# Patient Record
Sex: MALE | Race: WHITE
[De-identification: names, ages, dates, MRNs, and addresses within clinical notes are randomized per-mention and may not be internally consistent; named-entity substitution may affect disease eponyms.]

---

## 2018-09-09 ENCOUNTER — HOSPITAL ENCOUNTER (INPATIENT)
Dept: HOSPITAL 80 - FED | Age: 48
LOS: 2 days | Discharge: HOME | DRG: 64 | End: 2018-09-11
Attending: INTERNAL MEDICINE | Admitting: INTERNAL MEDICINE
Payer: COMMERCIAL
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
## 2018-09-09 DIAGNOSIS — Z87.891: ICD-10-CM

## 2018-09-09 DIAGNOSIS — I77.810: ICD-10-CM

## 2018-09-09 DIAGNOSIS — E86.9: ICD-10-CM

## 2018-09-09 DIAGNOSIS — I10: ICD-10-CM

## 2018-09-09 DIAGNOSIS — I77.74: ICD-10-CM

## 2018-09-09 DIAGNOSIS — R27.0: ICD-10-CM

## 2018-09-09 DIAGNOSIS — Q21.1: ICD-10-CM

## 2018-09-09 DIAGNOSIS — I63.9: Primary | ICD-10-CM
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
## 2018-09-09 LAB
INR PPP: 0.98 (ref 0.83–1.16)
PLATELET # BLD: 248 10^3/UL (ref 150–400)
PROTHROMBIN TIME: 13.2 SEC (ref 12–15)

## 2018-09-09 RX ADMIN — ACETAMINOPHEN PRN MG: 325 TABLET ORAL at 23:23

## 2018-09-09 NOTE — CPEKG
Test Reason : OPEN

Blood Pressure : ***/*** mmHG

Vent. Rate : 057 BPM     Atrial Rate : 058 BPM

   P-R Int : 224 ms          QRS Dur : 105 ms

    QT Int : 459 ms       P-R-T Axes : 045 000 032 degrees

   QTc Int : 447 ms

 

Sinus rhythm

Prolonged AL interval

Left ventricular hypertrophy

 

Confirmed by Joshua Guidry ([id number]) on 9/9/2018 3:14:53 PM

 

Referred By:             Confirmed By:Joshua Guidry

## 2018-09-09 NOTE — EDPHY
H & P


Time Seen by Provider: 09/09/18 13:46


HPI/ROS: 





Chief complaint.  Clumsiness with right arm and right leg





HPI.  Patient is in 48-year-old male developed clumsiness to right arm and 

right leg yesterday morning wish they say was about 30 hr ago at approximately 8

:00 a.m. On Saturday morning.  He has dizziness and vomiting.  They thought he 

had a middle ear infection and use some over-the-counter medications.  He is 

vomiting is better.  His dizziness is somewhat better however clumsiness and 

weakness to right arm and like with right leg has persisted.  He fell yesterday 

secondary to weakness and clumsiness.  He had a headache yesterday but not 

today.  His vision is normal.  No chest discomfort or shortness of breath or 

abdominal pain.  No similar symptoms previously





ROS


Constitutional.  no fever/chills, no weakness


Eyes.  no problems with vision


ENT.  no sore throat, no nasal drainage


Cardiovascular.  no chest pain


Respiratory.  no shortness of breath, no cough


Abdominal.  no abdominal pain, no nausea/vomiting, no diarrhea


.  no problems urinating


MS.  no calf pain/swelling, no neck/back pain, no joint pain


Skin.  no rash


Lymph.  no swollen glands


Neuro.  Headache yesterday.  Clumsiness and weakness to right arm and right leg


Past Medical/Surgical History: 





Occasional tachycardia


Social History: 





, nonsmoker, no alcohol


Smoking Status: Never smoked


Physical Exam: 





General Appearance:  Alert well-developed male mild distress vital signs 

significant for blood pressure 172/112


Eyes: Pupils equal and round no pallor or injection.


ENT, Mouth:  Mucous membranes are moist.


Respiratory:  There are no retractions, lungs are clear to auscultation.


Cardiovascular: Regular rate and rhythm.


Gastrointestinal:   Abdomen is soft and nontender, no masses, bowel sounds 

normal.


Neurological:  Awake and alert.  Speech is normal.  Cranial nerves are normal.  

There is no pronator drift.  Finger to nose is abnormal with the right arm.  

Heel-to-shin is somewhat abnormal with the right leg.  Left side is normal


Skin: Warm and dry, no rashes.


Musculoskeletal:  Neck is supple nontender.


Extremities  symmetrical, full range of motion.


Psychiatric: Patient is oriented X 3, there is no agitation.


Constitutional: 


 Initial Vital Signs











Temperature (C)  36.7 C   09/09/18 13:38


 


Heart Rate  64   09/09/18 13:38


 


Respiratory Rate  18   09/09/18 13:38


 


Blood Pressure  172/112 H  09/09/18 13:38


 


O2 Sat (%)  97   09/09/18 13:38








 











O2 Delivery Mode               Room Air














Allergies/Adverse Reactions: 


 





No Known Allergies Allergy (Unverified 09/09/18 13:38)


 











Medical Decision Making





- Diagnostics


EKG Interpretation: 





EKG interpreted by me shows normal sinus rhythm normal interval.  There is left 

axis deviation.  QRS shows electrical evidence for LVH.  No significant ST 

elevation or depression.  No arrhythmia.  The rate is 57


Imaging Results: 


 Imaging Impressions





Chest X-Ray  09/09/18 13:57


Impression:


1. No acute pulmonary disease.


2. Consider chest two views when the patient's medical condition permits.








Noncontrast head CT shows a subacute infarct in the right cerebellum in the 

posterior pica territory.  There is also dense right vertebral artery


Procedures: 





IV normal saline, monitor


ED Course/Re-evaluation: 





I consulted discussed the case with  at [facility].  He 

recommends admission for stroke workup but no intervention at this time





2:30 p.m. I re-evaluated the patient.  Stable and unchanged.  He and I and his 

wife discussed imaging and lab results.  We discussed treatment plan including 

recommendation for admission.  They expressed understanding





I consulted discussed case with Dr. Ratliff, neurology who recommends CTA head and 

neck and giving the patient aspirin





I consulted and discussed case with Dr. Harris, hospitalist who agrees to 

the admission


Differential Diagnosis: 





Patient head posterior fossa symptoms yesterday and thought was inner ear 

infection.  However he continued to be ataxic.  He has evidence of right 

cerebellar stroke.  He did have trauma while playing hockey on Friday the day 

before and it is possible the patient may have had a vertebral artery 

dissection as a cause.





- Data Points


Laboratory Results: 


 Laboratory Results





 09/09/18 13:50 





 09/09/18 13:50 





 











  09/09/18 09/09/18 09/09/18





  14:16 13:50 13:50


 


WBC      





    


 


RBC      





    


 


Hgb      





    


 


Hct      





    


 


MCV      





    


 


MCH      





    


 


MCHC      





    


 


RDW      





    


 


Plt Count      





    


 


MPV      





    


 


Neut % (Auto)      





    


 


Lymph % (Auto)      





    


 


Mono % (Auto)      





    


 


Eos % (Auto)      





    


 


Baso % (Auto)      





    


 


Nucleat RBC Rel Count      





    


 


Absolute Neuts (auto)      





    


 


Absolute Lymphs (auto)      





    


 


Absolute Monos (auto)      





    


 


Absolute Eos (auto)      





    


 


Absolute Basos (auto)      





    


 


Absolute Nucleated RBC      





    


 


Immature Gran %      





    


 


Immature Gran #      





    


 


PT      13.2 SEC SEC





     (12.0-15.0) 


 


INR      0.98 





     (0.83-1.16) 


 


Sodium    137 mEq/L mEq/L  





    (135-145)  


 


Potassium    3.8 mEq/L mEq/L  





    (3.3-5.0)  


 


Chloride    101 mEq/L mEq/L  





    ()  


 


Carbon Dioxide    28 mEq/l mEq/l  





    (22-31)  


 


Anion Gap    8 mEq/L mEq/L  





    (8-16)  


 


BUN    16 mg/dL mg/dL  





    (7-23)  


 


Creatinine    0.8 mg/dL mg/dL  





    (0.7-1.3)  


 


Estimated GFR    > 60   





    


 


Glucose    120 mg/dL H mg/dL  





    ()  


 


Calcium    9.7 mg/dL mg/dL  





    (8.5-10.4)  


 


POC Troponin I  0.00 ng/mL ng/mL    





   (0.00-0.08)   














  09/09/18





  13:50


 


WBC  8.01 10^3/uL 10^3/uL





   (3.80-9.50) 


 


RBC  4.73 10^6/uL 10^6/uL





   (4.40-6.38) 


 


Hgb  14.4 g/dL g/dL





   (13.7-17.5) 


 


Hct  42.0 % %





   (40.0-51.0) 


 


MCV  88.8 fL fL





   (81.5-99.8) 


 


MCH  30.4 pg pg





   (27.9-34.1) 


 


MCHC  34.3 g/dL g/dL





   (32.4-36.7) 


 


RDW  12.3 % %





   (11.5-15.2) 


 


Plt Count  248 10^3/uL 10^3/uL





   (150-400) 


 


MPV  9.9 fL fL





   (8.7-11.7) 


 


Neut % (Auto)  77.3 % H %





   (39.3-74.2) 


 


Lymph % (Auto)  13.5 % L %





   (15.0-45.0) 


 


Mono % (Auto)  8.4 % %





   (4.5-13.0) 


 


Eos % (Auto)  0.2 % L %





   (0.6-7.6) 


 


Baso % (Auto)  0.4 % %





   (0.3-1.7) 


 


Nucleat RBC Rel Count  0.0 % %





   (0.0-0.2) 


 


Absolute Neuts (auto)  6.19 10^3/uL 10^3/uL





   (1.70-6.50) 


 


Absolute Lymphs (auto)  1.08 10^3/uL 10^3/uL





   (1.00-3.00) 


 


Absolute Monos (auto)  0.67 10^3/uL 10^3/uL





   (0.30-0.80) 


 


Absolute Eos (auto)  0.02 10^3/uL L 10^3/uL





   (0.03-0.40) 


 


Absolute Basos (auto)  0.03 10^3/uL 10^3/uL





   (0.02-0.10) 


 


Absolute Nucleated RBC  0.00 10^3/uL 10^3/uL





   (0-0.01) 


 


Immature Gran %  0.2 % %





   (0.0-1.1) 


 


Immature Gran #  0.02 10^3/uL 10^3/uL





   (0.00-0.10) 


 


PT  





  


 


INR  





  


 


Sodium  





  


 


Potassium  





  


 


Chloride  





  


 


Carbon Dioxide  





  


 


Anion Gap  





  


 


BUN  





  


 


Creatinine  





  


 


Estimated GFR  





  


 


Glucose  





  


 


Calcium  





  


 


POC Troponin I  





  











Medications Given: 


 








Discontinued Medications





Sodium Chloride (Ns)  1,000 mls @ 0 mls/hr IV ONCE ONE; Wide Open


   PRN Reason: Protocol


   Stop: 09/09/18 13:58


   Last Admin: 09/09/18 14:05 Dose:  1,000 mls





Point of Care Test Results: 


 Chemistry











  09/09/18





  14:16


 


POC Troponin I  0.00 ng/mL ng/mL





   (0.00-0.08) 














Departure





- Departure


Disposition: Home, Routine, Self-Care


Clinical Impression: 


 Acute ischemic stroke





Condition: Fair


Referrals: 


JENNIFER BURNETT [Primary Care Provider] - As per Instructions

## 2018-09-09 NOTE — GCON
CONSULTING SERVICE:  Emergency and Hospitalist Medicine.



REASON FOR CONSULT:  Left PICA infarct with left cerebellar hemispheric infarct.



HISTORY OF PRESENT ILLNESS:  The patient is a very healthy 48-year-old male who developed clumsiness 
to his right arm and right leg yesterday over a day ago, possibly associated with a very physical kermit
nt at a hockey game.  He initially thought he had an ear infection, used some over-the-counter medica
tions, but due to persistent clumsiness and weakness of his right arm and leg, he presented to the [facility] today.  He is wide awake and alert.  His wife is at the bedside as well.  They provide their o
wn history.  Head CT was performed which demonstrated a right-sided cerebellar infarct consistent wit
h a PICA stroke and vascular imaging is consistent with a possible dissection



PAST MEDICAL/SURGICAL HISTORY:  Per HPI.  Occasional tachycardia.



CODE STATUS:  Full.



ALLERGIES:  None.



SOCIAL HISTORY:  .  Nonsmoker.  No alcohol use.  Denies illicits.



FAMILY HISTORY:  Reviewed.  No history of stroke.



REVIEW OF SYSTEMS:  Ten points reviewed and negative other than as stated in HPI.



PHYSICAL EXAM:  VITALS:  Afebrile at 36.7, heart rate 64, respiratory rate 18, blood pressure 172/112
, saturations 97% on room air.  NEUROLOGIC:  The patient is awake, alert, oriented x3.  He appears hi
s stated age.  He is in no acute distress.  He has normal fluent speech.  He provides his own history
.  He has normal cranial nerves.  He has 5/5 strength in all extremities.  He has some very mild dysd
iadochokinesia and dysmetria of the right upper and lower extremities, but this is very, very subtle.
  He has normal reflexes.  Otherwise, no cerebellar findings.  Gait is not disturbed.



LABORATORIES:  White blood cells 8, hemoglobin 14, platelet count 248.  PT and INR are 13 and 0.98.  
Sodium 137, potassium 3.8, BUN 16, creatinine 0.8, glucose 120.  Troponin is 0.00.



IMAGING:  I reviewed the patient's CT of the head without contrast and CT of the head with contrast a
nd agree with an occlusion of the distal right vertebral artery and a concomitant right-sided PICA in
farct with infarction of the inferior 1/3 of the right cerebral hemisphere without significant efface
ment of the 4th ventricle or hydrocephalus.



IMPRESSION AND PLAN:  A 48-year-old very healthy male who has what appears to be a right-sided distal
 vertebral artery occlusion and right-sided posterior inferior cerebellar artery infarct, who is in v
khoi good neurologic condition.  This is possibly related to a traumatic etiology at a hockey match ye
sterday.  Regardless, the infarct is completed and his symptoms are over a day old, so there is no ro
le for chemo or mechanical intervention at this point in time.  He is in extremely good condition.  A
t this point, I would observe him in the ICU and keep an eye on his ins and outs and sodium levels to
 prevent neurologic deterioration from cerebellar hemispheric swelling.  We are following along and t
he stroke neurology team is going to consult as well.  Avoid narcotics if able. 



Thank you for this consult.





Job #:  [id number]

## 2018-09-09 NOTE — PDGENHP
History and Physical





- Chief Complaint


vertigo, right sided clumsiness





- History of Present Illness


Patient is a 49 yo M with no significant history other than borderline HTN and 

occasional tachycardia presenting with approximately 30 hours of vertigo and 

right sided clumsiness. He notes that the sxs began the morning prior to 

admission. He woke in the morning and noted that the room was spinning, this 

was associated with nausea and vomiting. He was having difficulty walking and 

stumbled to the bathroom and then got back in bed. He and his wife called his 

PCP and they were told this is likely vertigo and to try meclizine. He 

essentially stayed in bed all day yesterday due to these sxs. This morning he 

woke up and noticed that when he tried to wipe his nose with his right hand it 

was much harder than when he tried to do it with his left, it just seemed 

clumsier. He still could not walk unless he was side stepping and the vertigo 

remained present as well. At that time they consulted a friend who is a 

cardiologist who recommended they come to the hospital for evaluation. In the ER

, patient was found to have a large subacute infarct in the right cerebellum. 





History Information





- Allergies/Home Medication List


Allergies/Adverse Reactions: 








No Known Allergies Allergy (Verified 18 16:03)


 





Home Medications: 








Ibuprofen [Motrin (*)] 400 - 600 mg PO DAILY PRN 18 [Last Taken 18]


Meclizine HCl [Meclizine HCl 25 mg (RX,OTC)] 25 mg PO BID PRN 18 [Last 

Taken 18 3 IN LAST 24 HRS]


diphenhydrAMINE HCL [Diphenhydramine HCl] 25 mg PO DAILY PRN 18 [Last 

Taken 18]





I have personally reviewed and updated: family history, medical history, social 

history, surgical history





- Past Medical History


hypertension (borderline, never treated)





- Surgical History


Reports: no pertinent surgical hx





- Family History


Additional family history: both parents  in their 40s: father  of a 

diving accident, mother  of pulmonary edema related to an asthma attack





- Social History


Smoking Status: Former smoker (briefly smoked in HS/college)


Alcohol Use: Occasionally


Drug Use: None


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 

up company.  plays hockey frequently including day prior to sxs beginning at 

which time he suffered a knock to the head





Review of Systems


Review of Systems: 





ROS: 10pt was reviewed & negative except for what was stated in HPI & below





Physical Exam


Physical Exam: 

















Temp Pulse Resp BP Pulse Ox


 


 36.7 C   85   21 H  146/90 H  96 


 


 18 20:00  18 20:00  18 20:00  18 20:00  18 20:00











Constitutional: no apparent distress, appears nourished


Eyes: PERRL


Ears, Nose, Mouth, Throat: moist mucous membranes, hearing normal


Cardiovascular: regular rate and rhythym, no murmur, rub, or gallop, No edema


Respiratory: no respiratory distress, no rales or rhonchi, clear to auscultation


Gastrointestinal: normoactive bowel sounds, soft, non-tender abdomen


Genitourinary: no bladder tenderness


Skin: warm, normal color


Musculoskeletal: full muscle strength


Neurologic: AAOx3, CN II-XII Intact, other (right upper and lower extremity 

decreased coordination, abnormal gait)


Psychiatric: interacting appropriately, not anxious, not encephalopathic





Lab Data & Imaging Review





 18 13:50





 18 13:50














WBC  8.01 10^3/uL (3.80-9.50)   18  13:50    


 


RBC  4.73 10^6/uL (4.40-6.38)   18  13:50    


 


Hgb  14.4 g/dL (13.7-17.5)   18  13:50    


 


Hct  42.0 % (40.0-51.0)   18  13:50    


 


MCV  88.8 fL (81.5-99.8)   18  13:50    


 


MCH  30.4 pg (27.9-34.1)   18  13:50    


 


MCHC  34.3 g/dL (32.4-36.7)   18  13:50    


 


RDW  12.3 % (11.5-15.2)   18  13:50    


 


Plt Count  248 10^3/uL (150-400)   18  13:50    


 


MPV  9.9 fL (8.7-11.7)   18  13:50    


 


Neut % (Auto)  77.3 % (39.3-74.2)  H  18  13:50    


 


Lymph % (Auto)  13.5 % (15.0-45.0)  L  18  13:50    


 


Mono % (Auto)  8.4 % (4.5-13.0)   18  13:50    


 


Eos % (Auto)  0.2 % (0.6-7.6)  L  18  13:50    


 


Baso % (Auto)  0.4 % (0.3-1.7)   18  13:50    


 


Nucleat RBC Rel Count  0.0 % (0.0-0.2)   18  13:50    


 


Absolute Neuts (auto)  6.19 10^3/uL (1.70-6.50)   18  13:50    


 


Absolute Lymphs (auto)  1.08 10^3/uL (1.00-3.00)   18  13:50    


 


Absolute Monos (auto)  0.67 10^3/uL (0.30-0.80)   18  13:50    


 


Absolute Eos (auto)  0.02 10^3/uL (0.03-0.40)  L  18  13:50    


 


Absolute Basos (auto)  0.03 10^3/uL (0.02-0.10)   18  13:50    


 


Absolute Nucleated RBC  0.00 10^3/uL (0-0.01)   18  13:50    


 


Immature Gran %  0.2 % (0.0-1.1)   18  13:50    


 


Immature Gran #  0.02 10^3/uL (0.00-0.10)   18  13:50    


 


PT  13.2 SEC (12.0-15.0)   18  13:50    


 


INR  0.98  (0.83-1.16)   18  13:50    


 


Sodium  137 mEq/L (135-145)   18  13:50    


 


Potassium  3.8 mEq/L (3.3-5.0)   18  13:50    


 


Chloride  101 mEq/L ()   18  13:50    


 


Carbon Dioxide  28 mEq/l (22-31)   18  13:50    


 


Anion Gap  8 mEq/L (8-16)   18  13:50    


 


BUN  16 mg/dL (7-23)   18  13:50    


 


Creatinine  0.8 mg/dL (0.7-1.3)   18  13:50    


 


Estimated GFR  > 60   18  13:50    


 


Glucose  120 mg/dL ()  H  18  13:50    


 


Calcium  9.7 mg/dL (8.5-10.4)   18  13:50    


 


POC Troponin I  0.00 ng/mL (0.00-0.08)   18  14:16    








Visualized and Interpreted Chest x-ray results: Yes


Chest X-Ray results: no infiltrate


Visualized and Interpreted imaging results: Yes


Interpretation: head CT: large right sided cerebellar infarct.  head/neck CTA: 

complete occlusion of distal right vertebral artery likely 2/2 dissection


EKG Interpretation: Positive for: LVH, normal sinsus rhythm





Assessment & Plan


Assessment: 








Acute ischemic stroke (Acute)





49 yo M with no significant pmh admitted with large right cerebellar CVA





# cerebellar CVA: significant sized infarct with associated complete occlusion 

of distal right vertebral artery likely 2/2 dissection given hx of playing 

hockey and having a head injury the day prior to his sxs beginning. Given the 

size and location of the infarct, patient is high risk for cerebral edema and 

herniation and will be monitored in ICU with q1 hour neuro checks and plan for 

stat head CT if sxs change. Neurology and neurosurgery are aware and are 

following, reviewed care plan with nursing and patient at length. 


# vertigo: 2/2 above, sxs are improving, will have pt/ot involved


# htn: allowing for permissive htn for now, will continue to monitor, may 

require bp meds at dc


# IP status, high risk requiring close monitoring in ICU


Patient new to my care. Old records reviewed and summarized as above, care plan 

reviewed with neurology, neurosurgery. Further hx obtained from patients wife 

present at bedside.

## 2018-09-10 RX ADMIN — ACETAMINOPHEN PRN MG: 325 TABLET ORAL at 08:51

## 2018-09-10 RX ADMIN — ASPIRIN 81 MG SCH MG: 81 TABLET ORAL at 08:51

## 2018-09-10 RX ADMIN — ACETAMINOPHEN PRN MG: 325 TABLET ORAL at 18:01

## 2018-09-10 RX ADMIN — ACETAMINOPHEN PRN MG: 325 TABLET ORAL at 13:01

## 2018-09-10 RX ADMIN — ACETAMINOPHEN PRN MG: 325 TABLET ORAL at 04:08

## 2018-09-10 NOTE — NEUROPROG
Assessment: 


[id number]


-


Neurology Consult:


-


CC: Dr. Harris consulted neurology for Cerebellar Stroke.  Results placed in 

EMR for her review.


-


HPI:


Pt [facility] for 30 hours of right hand clumsiness and 

vertigo.  A head CT showed a subacute right cerebellar stroke.  He had played 

hockey on 9/7/18 prior to his symptoms and struck his head indicating he may 

have developed a right vertebral dissection to explain his stroke.  CTA head/

neck showed an occluded right vert artery.  Teleneurology did not recommend TPA 

or transfer for intra-arterial therapy.  Given large infratentorial stroke the 

patient was placed in ICU and neurosurgery was consulted to watch for any 

hydrocephalus or hemorrhagic conversion.  I initially saw the patient on 9/10/

18.  His neurologic exam showed right arm and right leg ataxia.  NIH SS 1.  He 

reported his symptoms were much improved and he continued to feel better.  No 

new complaints.


-


PMHx: borderline HTN, occasional tachycardia


-


SHx: former tobacco use


FHx: asthma, pulm edema


-


ROS: Pt denied acute fever, total vision loss, active severe chest pain, 

respiratory failure, total body severe rash, total bowel/bladder incontinence, 

psychosis, active seizures, or active bleeding


-


O:


VS reviewed


General: Alert


Eyes: Fundoscopic exam not able to visualize optic disks


CV: Heart RRR, no murmur, no carotid bruit


Lungs: Clear to auscultation bilaterally, no rhonchi or rales


Neuro:


- Mental: 


.     Oriented x person/place/date


.     concentration appears normal


.     speech fluency/comprehension normal


.     memory appears normal


.     fund of knowledge appear intact


- Cranial Nerves:


.     II: PERRL, VFFTC


.     III/IV/VI: EOMI, no nystagmus, normal smooth pursuits, no Ptosis


.     V: facial sensation intact to LT


.     VII: face symmetric to eye closure and smile


.     VIII: hearing intact to conversation


.     IX/X: uvula raises symmetrically


.     XI: SCM 5/5 B/L strength


.     XII: tongue protrudes midline w/nl strength


- Motor: 


.     Tone: normal tone in all 4 extremity


.     Strength: no pronator drift, strength 5/5 throughout (B/L delt, bic, tri, 

hand , hf/he, df/pf)


- Reflexes: B/L bic/BR/patella 2/4


- Sensory: all 4 extremity intact to light touch


- Coord: problems with right arm and leg coordinated movements which was mild 

to moderate


- Gait: deferred


- NIH SS 1 (Right arm/leg ataxia)


-


Labs:


9/9/18- CBC wnl, CMP Gluc 120, INR 0.98


9/10/18- LDL 94, H1AC pending


-


Rads:


9/9/18- Head CT w/o: subacute R posteroinferior cerebellar artery infarct, 

dense distal R vert suggesting thrombus/occlusion, no hemorrhage (I personally 

visualized the images on 9/10/18)


9/9/18- Head/neck CTA: occluded R vert likely secondary to vert dissection, 

subacute R cerebellar CVA


-


Assessment:


1. 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 

on 9/8/18: Neurologic exam on 9/10/18 showed right sided ataxia.  History of a 

head trauma playing hockey on 9/7/18 preceding the stroke and the patients 

young age and lack of stroke risk factors makes vertebral dissection the most 

likely cause of his stroke.  Pt not on aspirin prior to stroke so he was placed 

on aspirin 81 mg qd.  LDL 94 so he will need a statin.  I will complete a 

stroke w/u.  We discussed hematology consult, referral to a stroke expert, and 

an implanted cardiac monitoring device but patient declined these things after 

I told him I did not think it was likely they would .


-


Plan:


- Pt on ICU status monitoring for any infratentorial hemorrhagic conversion or 

hydrocephalus, appreciate neurosurgery support, if patient is doing well 

tomorrow morning I think he can leave the ICU from a neurology standpoint


- Agree with aspirin 81 mg qd for stroke prevention (not on aspirin prior to 

stroke), pt will need this life-long


- Brain MRI w/o con


- TTE


- 24 hour telemetry


- Blood pressure < 220/120 x 24 hours then < 140/90 long term


- LDL goal < 70 (94), recommend beginning a statin


- H1AC < 7.0 (pending)


- PT/OT/speech for any rehab needs


- F/U in neurology clinic 1-2 weeks after hospital discharge





Objective: 





 Vital Signs











Temp Pulse Resp BP Pulse Ox


 


 36.7 C   67   18   143/95 H  94 


 


 09/09/18 20:00  09/10/18 08:00  09/10/18 08:00  09/10/18 08:00  09/10/18 08:00








 











 09/09/18 09/10/18 09/11/18





 05:59 05:59 05:59


 


Intake Total  500 


 


Output Total  500 


 


Balance  0 








 











PT  13.2 SEC (12.0-15.0)   09/09/18  13:50    


 


INR  0.98  (0.83-1.16)   09/09/18  13:50    











Allergies/Adverse Reactions: 


 





No Known Allergies Allergy (Verified 09/09/18 16:03)

## 2018-09-10 NOTE — PDMN
Medical Necessity


[place] [facility] [place]: 49 y/o w/ acute ischemic stroke, 

cerebellar CVA significant size w/ assoc occlusion of distal R vertebral artery 

likely 2/2 dissection, high risk for cerebral edema and herniation, neurology 

and neurosurgery consulted.  IP only

## 2018-09-10 NOTE — HOSPPROG
Hospitalist Progress Note


Assessment/Plan: 


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 

trauma (hockey injury). Very mild right sided ataxia on exam that is reportedly 

much improved since admission. Vertigo has resolved. 


   - Neurology and neurosurgery consulted


   - Brain MRI today demonstrates subacute infarct in PICA territory with mild 

mass effect


   - TTE pending


   - LDL 94. Discussed starting statin with patient and wife today. They want 

to think about this


   - Continue aspirin 81mg daily (started this admission)


   - Telemetry


   - PT/OT/SLP - recommending home PT/OT at present


   - BP goal <140/90. May need to initiate anti-hypertensive prior to discharge





Diet: regular


VTE ppx:


Code: full


Dispo: Remain inpatient in ICU for frequent neurologic checks. Plan to transfer 

to floor tomorrow. 


Subjective: Doing much better than on arrival to hospital. Walking around room 

without difficulty. Able to brush teeth today which he hasn't been able to do.


Objective: 


 Vital Signs











Temp Pulse Resp BP Pulse Ox


 


 36.8 C   72   18   145/87 H  96 


 


 09/10/18 12:00  09/10/18 14:00  09/10/18 14:00  09/10/18 14:00  09/10/18 12:00








 











 09/09/18 09/10/18 09/11/18





 05:59 05:59 05:59


 


Intake Total  500 


 


Output Total  500 


 


Balance  0 








 











PT  13.2 SEC (12.0-15.0)   09/09/18  13:50    


 


INR  0.98  (0.83-1.16)   09/09/18  13:50    














- Physical Exam


Constitutional: no apparent distress, appears nourished, not in pain


Eyes: PERRL, anicteric sclera, EOMI


Ears, Nose, Mouth, Throat: moist mucous membranes, hearing normal, ears appear 

normal, no oral mucosal ulcers


Cardiovascular: regular rate and rhythym, no murmur, rub, or gallop


Respiratory: no respiratory distress, no rales or rhonchi, clear to auscultation


Gastrointestinal: normoactive bowel sounds, soft, non-tender abdomen, no 

palpable masses


Skin: no rashes or abrasions, no fluctuance, no induration


Neurologic: AAOx3, sensation intact bilaterally, CN II-XII Intact, other (very 

mild RUE and RLE ataxia, narrow based gait), No weakness, No numbness, No 

pronator drift, No facial droop


Psychiatric: interacting appropriately, not anxious, not encephalopathic, 

thought process linear





ICD10 Worksheet


Patient Problems: 


 Problems











Problem Status Onset


 


Acute ischemic stroke Acute

## 2018-09-10 NOTE — ECHO
[email or web address]





[address] [address] 

Main: [phone] 



Fax: 



Transthoracic Echocardiogram 

Name:             FRED MONIQUE                     MR#:

[id number]

Study Date:       09/10/2018                            Study Time:

08:03 AM

YOB: 1970                            Age:

48 year(s)

Height:           182.9 cm (72 in.)                     Weight:

92.99 kg (205 lb.)

BSA:              2.15 m2                               Gender:

Male

Examination:      Echo                                  Indication:

ischemic stroke

Image Quality:    Adequate                              Contrast: 

Requested by:     Sailaja Harris                       BP:

143 mmHg/92 mmHg

Heart Rate:                                             Rhythm: 

Indication:       ischemic stroke 



Procedure Staff 

Ultrasound Technician:   Emily Murray RDCS 

Reading Physician:       Dhiraj Darnell MD 

Requesting Provider: 



Conclusions:           Normal size left ventricle.  

Mild concentric LV hypertrophy.  

Normal global systolic LV function.  

EF is 56 %.  

No regional wall motion abnormality.  

The left atrium is normal in size.  

An agitated saline study was performed and was positive for

intracardiac shunting.

Mild mitral valve regurgitation is present.  

Mild tricuspid regurgitation is present.  

There is moderate dilation of the aortic sinuses of Valsalva measuring

5.2 cm. Consider

transesophageal echocardiography for further evaluation in light of

the positive agitated saline

contrast study and dilated aorta. 



Measurements: 

Chambers                     Valvular Assessment AV/MV

Valvular Assessment TV/PV



Normal                                    Normal

Normal

Name         Value      Range              Name         Value Range

Name           Value Range

Ao Sushma (2D): 5.1 cm     (1.4 cm-2.6            AV Vmax:     0.96 m/s

(1 m/s-1.7        TR Vmax:       2.38 mm/s ( - )



cm)                                   m/s)             TR PGmax:

23 mmHg ( - )

IVSd (2D):   1.3 cm (0.6 cm-1.1                AV maxP mmHg ( -

)           syst. PAP: 28 mmHg   ( - )



cm)                AV meanPG:   3 mmHg ( - )           PV Vmax:

1.06 m/s (0.6 m/s-0.9

LVDd (2D):   5.1 cm     (4.2 cm-5.9            JAZZY (VTI):   5.1 cm ( -

)                                m/s)



cm)                MV E Vmax:   0.41 m/s ( - )         PV PGmax:

4  mmHg ( - )

LVDs (2D):   3.3 cm     (2.1 cm-4              MV A Vmax:   0.50 m/s (

- )



cm)                MV E/A:      0.82  ( - )  

LVPWd (2D):  1.1 cm     (0.6 cm-1 



cm)                    MV PHT:      0.089 s ( - )  

LVOTd        2.9 cm     2.9 cm mm              MVA (PHT):   2.5 s ( -

)



LVEF (BP):   56 %       (>=55 %)   

RVDd(2D):    3.6 cm     (1.9 cm-3.8 



cmmm)  



Patient: FRED MONIQUE                    MRN: [id number]

Study Date: 09/10/2018   Page 1 of 2

08:03 AM 









Continued Measurements: 

Chambers                      Valvular Assessment AV/MV

Valvular Assessment TV/PV



Name                       Value           Name

Value     Name                      Value

LADs:                    3.5 cm                MV DecTime:

292 m/s      CVP (est.):             5 mmHg

LADs Lon.3 cm                MV E' Septal:

0.07  m/s

LA Area:                 18.1 cm2          MV E/E' Septal:        6.00



LA Volume:               57 ml             MV E/E' Lateral:       4.20



LA Volume Index:         26.5 ml/m2   

RA Area:                 23.1 cm2   



Additional Vessels  



Name                       Value  

Ao Ascendin.0 cm    



Findings:              Left Ventricle: 

Normal size left ventricle. Mild concentric LV hypertrophy. Normal

global systolic LV function. EF is

56 %. No regional wall motion abnormality. Unable to assess diastolic

dysfunction.

Right Ventricle: 

Normal size right ventricle. Normal RV function.  

Left Atrium: 

The left atrium is normal in size. An agitated saline study was

performed and was positive for

intracardiac shunting.  

Right Atrium: 

The right atrium is normal in size.  

Mitral Valve: 

The mitral valve is normal in appearance and function. Mild mitral

valve regurgitation is present. No

mitral stenosis is present.  

Aortic Valve: 

The aortic valve is tri-leaflet. There is no significant aortic valve

regurgitation. No aortic valve

stenosis is present.  

Tricuspid Valve: 

The tricuspid valve is normal in appearance and function. Mild

tricuspid regurgitation is present. The

pulmonary artery pressure is normal. Right ventricular systolic

pressure measures 28mmHg.

Pulmonic Valve: 

The pulmonic valve is normal in appearance and function. There is no

pulmonic regurgitation seen.

Aorta: 

The aorta is normal. Normal size ascending aorta measuring 4.0 cm.  

IVC: 

Dilated aortic root.  

Pericardium: 

No pericardial effusion. No pleural effusion. 







Electronically signed by Dhiraj Darnell MD on 09/10/2018 at 03:27 PM 

(No Signature Object) 



Patient: FRED MONIQUE                    MRN: [id number]

Study Date: 09/10/2018   Page 2 of 2

08:03 AM 







[id number]

## 2018-09-10 NOTE — NEUSURGPN
Assessment/Plan: 


Assessment: 48 yr old M with possible traumatic right vertebral artery 

dissection, PICA stroke





Plan:


-patient reports improvement with symptoms today


-will defer to neurology for stroke management 


-ok to transfer to floor from neurosurgery standpoint


-PT/OT/ST


-patient discussed with Dr Brown


please call neurosurgery with any questions/concerns 





Subjective: 


Sitting in chair, denies any concerns 


Objective: 


AxO x3


PERRLA, EOMI


CN 2-12 grossly intact


Clear speech 


5/5 BUE, BLE 


Neuro Check Frequency: per routine 


Urinary Catheter in Place: No





- Physician


Discussed Patient with : Kevin





Neurosurgery Physical Exam





- Vitals, I&O, Labs





 I and O











 09/09/18 09/10/18 09/11/18





 05:59 05:59 05:59


 


Intake Total  500 


 


Output Total  500 


 


Balance  0 


 


Weight  92.986 kg 


 


Intake:   


 


  Oral (ml)  500 


 


Output:   


 


  Urine (ml)  500 


 


    Urinal  500 


 


Other:   


 


  Number of Voids   


 


    Toilet  2 








 Vital Signs











Temp Pulse Resp BP Pulse Ox


 


 36.7 C   67   18   143/95 H  94 


 


 09/09/18 20:00  09/10/18 08:00  09/10/18 08:00  09/10/18 08:00  09/10/18 08:00














ICD10 Worksheet


Patient Problems: 


 Problems











Problem Status Onset


 


Acute ischemic stroke Acute

## 2018-09-10 NOTE — ASMTCMCOM
CM Note

 

CM Note                       

Notes:

48yr old male admitted for vertigo, R sided weakness, Stroke.  He has a Hx of borderline HTN and 

occasional tachy. Therapies to eval for discharge needs. May be appropriate for In-pt Rehab. CM to 

ramiro.

 

Date Signed:  09/10/2018 09:21 AM

Electronically Signed By:Kika Izquierdo LCSW

## 2018-09-11 VITALS — DIASTOLIC BLOOD PRESSURE: 96 MMHG | SYSTOLIC BLOOD PRESSURE: 137 MMHG

## 2018-09-11 RX ADMIN — ACETAMINOPHEN PRN MG: 325 TABLET ORAL at 06:12

## 2018-09-11 RX ADMIN — ASPIRIN 81 MG SCH MG: 81 TABLET ORAL at 09:33

## 2018-09-11 RX ADMIN — ACETAMINOPHEN PRN MG: 325 TABLET ORAL at 01:41

## 2018-09-11 NOTE — NEUSURGPN
Assessment/Plan: 


Assessment: 48 yr old M with possible traumatic right vertebral artery 

dissection, PICA stroke





Plan:


-patient reports no symptoms today 


-will defer to neurology for stroke management 


-MRI brain shows subacute infarct in the right posterior inferior cerebellar 

artery territory measuring 6x4cm with mild mass effect 


-ok to transfer to floor from neurosurgery standpoint


-PT/OT/ST


-neurosurgery will sign off, please contact us with any questions or change in 

status 


-patient discussed with Dr Brown








Subjective: 


No new complaints 


Objective: 


AxO x3


CN 2-12 grossly intact


PERRLA EOMI


5/5 BUE, BLE





Neuro Check Frequency: per routine 


Urinary Catheter in Place: No





- Physician


Discussed Patient with : Kevin





Neurosurgery Physical Exam





- Vitals, I&O, Labs





 I and O











 09/10/18 09/11/18 09/12/18





 05:59 05:59 05:59


 


Intake Total 500 1000 


 


Output Total 500  


 


Balance 0 1000 


 


Weight 92.986 kg  


 


Intake:   


 


  Oral (ml) 500 1000 


 


Output:   


 


  Urine (ml) 500  


 


    Urinal 500  


 


Other:   


 


  Intake Quantity  Yes 





  Sufficient   


 


  Number of Voids   


 


    Toilet 2 4 


 


  Number of Stools   


 


    Toilet  0 








 Vital Signs











Temp Pulse Resp BP Pulse Ox


 


 36.9 C   67   16   145/98 H  96 


 


 09/10/18 20:00  09/11/18 06:00  09/11/18 06:00  09/11/18 06:00  09/11/18 06:00














ICD10 Worksheet


Patient Problems: 


 Problems











Problem Status Onset


 


Acute ischemic stroke Acute

## 2018-09-11 NOTE — ASDISCHSUM
----------------------------------------------

Discharge Information

----------------------------------------------

Plan Status:Home with No Needs                       Medically Cleared to Leave:09/11/2018

Discharge Date:09/11/2018                            CM D/C Disposition:Home, Routine, Self-Care

ADT D/C Disposition:Home, Routine, Self-Care         Projected Discharge Date:09/11/2018 04:00 PM

Transportation at D/C:Family                         Discharge Delay Reason:

Follow-Up Date:09/11/2018 04:00 PM                   Discharge Slot:

Final Diagnosis:Cerebellar CVA

----------------------------------------------

Placement Information

----------------------------------------------

----------------------------------------------

Patient Contact Information

----------------------------------------------

Contact Name:TONY                            Relationship:Wife

Address:[address]                            Home [phone]

                                                     Work [phone]

City:[place]                                         Alternate Phone:

State/Zip Code:[place] [address]                              Email:

----------------------------------------------

Financial Information

----------------------------------------------

Financial Class:[facility]

Primary Plan Desc:[facility] PLAN               Primary Plan Number:[id number]

Secondary Plan Desc:                                 Secondary Plan Number:

 

 

----------------------------------------------

Assessment Information

----------------------------------------------

----------------------------------------------

LACE

----------------------------------------------

LACE

 

Length of stay for            Answers:  2 days                                

current admission                                                             

Acuity / Level of             Answers:  Yes                                   

Care: Did the patient                                                         

have an inpatient                                                             

admission?                                                                    

Comorbidities - select        Answers:  Other                         Notes:  HTN

all that apply                                                                

# of Emergency department     Answers:  1-2                                   

visits in the last 6                                                          

months                                                                        

Score: 7

 

Date Signed:  09/11/2018 02:49 PM

Electronically Signed By:Kika Izquierdo LCSW

 

 

----------------------------------------------

[facility] CM Progress Note

----------------------------------------------

CM Note

 

CM Note                       

Notes:

48yr old male admitted for vertigo, R sided weakness, Stroke.  He has a Hx of borderline HTN and 

occasional tachy. Therapies to eval for discharge needs. May be appropriate for In-pt Rehab. CM to 

ramiro.

 

Date Signed:  09/10/2018 09:21 AM

Electronically Signed By:Kika Izquierdo LCSW

 

 

----------------------------------------------

Case Management Discharge Plan Note

----------------------------------------------

Case Management Discharge

 

Discharge Order Complete?     Answers:  Yes                                   

Patient to Obtain             Answers:  via Family                            

Medications                                                                   

Transportation Arranged       Answers:  Family/Friends                        

Transport will Pick (Date     09/11/2018 04:00 PM

& Time)                       

Family Notified               Answers:  Yes                           Notes:  Family to transport

Discharge Comments            

Notes:

Patient has been discharged home with wife.

 

Date Signed:  09/11/2018 02:49 PM

Electronically Signed By:Kika Izquierdo LCSW

 

 

----------------------------------------------

Intervention Information

----------------------------------------------

## 2018-09-11 NOTE — NEUROPROG
Assessment: 


[id number]_1970


-


Neurology Consult:


-


CC: F/U for stroke


-


Narrative Summary:


Pt was admitted to [facility] on 9/9/18 for 30 hours of right hand clumsiness and 

vertigo.  A head CT showed a subacute right cerebellar stroke.  He had played 

hockey on 9/7/18 prior to his symptoms and struck his head indicating he may 

have developed a right vertebral dissection to explain his stroke.  CTA head/

neck showed an occluded right vert artery.  Teleneurology did not recommend TPA 

or transfer for intra-arterial therapy.  Given large infratentorial stroke the 

patient was placed in ICU and neurosurgery was consulted to watch for any 

hydrocephalus or hemorrhagic conversion.  I initially saw the patient on 9/10/

18.  His neurologic exam showed right arm and right leg ataxia.  NIH SS 1.  He 

reported his symptoms were much improved and he continued to feel better.  No 

new complaints.


-


HPI:


F/U 9/10/18.  Brain MRI showed only R cerebellar stroke.  TTE showed positive 

bubble study and dilated aorta so it was recommended to consider a DAVID.  I will 

plan on asking cardiology to evaluate the patient and get a DAVID given the 

findings on TTE.  He continues to improve and feels almost no symptoms at this 

time.  H1AC normal.


-


PMHx: borderline HTN, occasional tachycardia


-


SHx: former tobacco use


FHx: asthma, pulm edema


-


ROS: Pt denied acute fever, total vision loss, active severe chest pain, 

respiratory failure, total body severe rash, total bowel/bladder incontinence, 

psychosis, active seizures, or active bleeding


-


Labs:


9/9/18- CBC wnl, CMP Gluc 120, INR 0.98


9/10/18- LDL 94, H1AC 5.7


-


Rads:


9/9/18- Head CT w/o: subacute R posteroinferior cerebellar artery infarct, 

dense distal R vert suggesting thrombus/occlusion, no hemorrhage (I personally 

visualized the images on 9/10/18)


9/9/18- Head/neck CTA: occluded R vert likely secondary to vert dissection, 

subacute R cerebellar CVA


9/10/18- TTE: positive bubble study and dilated aorta, consider DAVID


9/10/18- 24 hour telemetry: no afib seen


9/10/18- Brain MRI w/o con: Subacute infarct in the right posteroinferior 

cerebellar artery territory, measuring 6 x 4 cm, in the inferior right 

cerebellar hemisphere with mild mass effect. 2. Mild microvascular ischemic 

gliosis in the white matter of bilateral cerebral hemispheres. 3. No 

hydrocephalus or midline shift or herniation. 


-


Assessment:


1. 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 

on 9/8/18: Neurologic exam on 9/10/18 showed right sided ataxia.  History of a 

head trauma playing hockey on 9/7/18 preceding the stroke and the patients 

young age and lack of stroke risk factors makes vertebral dissection the most 

likely cause of his stroke.  Pt not on aspirin prior to stroke so he was placed 

on aspirin 81 mg qd.  LDL 94 so it was recommended to start a statin.  H1AC 

5.7.  Brain MRI showed only R cerebellar stroke and head/neck CTA showed right 

vert occlusion.  24 hour telemetry showed no afib.  TTE showed +bubble study 

and dilated aorta so we will obtain formal cardiology consult and DAVID to 

further evaluate.


-


Plan:


- OK to leave ICU and move to floor


- Agree with aspirin 81 mg qd for stroke prevention (not on aspirin prior to 

stroke), pt will need this life-long


- TTE showed +bubble study and dilated aorta so we will obtain formal 

cardiology consult and DAVID to further evaluate.


- Blood pressure < 140/90


- LDL goal < 70 (94), recommend beginning a statin


- H1AC < 7.0 (5.7)


- PT/OT/speech for any rehab needs


- F/U in neurology clinic 1-4 weeks after hospital discharge


-


35 min spent with patient, majority of time spent counseling on stroke 

prognosis and diagnostic w/u.





Objective: 





 Vital Signs











Temp Pulse Resp BP Pulse Ox


 


 36.6 C   75   20   131/93 H  98 


 


 09/11/18 08:00  09/11/18 08:00  09/11/18 08:00  09/11/18 08:00  09/11/18 08:00








 











 09/10/18 09/11/18 09/12/18





 05:59 05:59 05:59


 


Intake Total 500 1000 


 


Output Total 500  


 


Balance 0 1000 








 











PT  13.2 SEC (12.0-15.0)   09/09/18  13:50    


 


INR  0.98  (0.83-1.16)   09/09/18  13:50    











Allergies/Adverse Reactions: 


 





No Known Allergies Allergy (Verified 09/09/18 16:03)

## 2018-09-11 NOTE — PDDCSUM
Discharge Summary


Discharge Summary: 


Date of Admission: 9/9/2018


Date of Discharge: 9/11/2018





Consultants: neurology, neurosurgery





Procedures/Studies: brain MRI, CTA head/neck, TTE





Discharge Diagnoses:


1. Right vertebral artery dissection, resulting in


2. Right cerebellar CVA (PICA territory)


3. Right sided ataxia and vertigo, now essentially resolved


4. Dilated aortic sinuses of Valsalva


5. Patent foramen ovale





Brief Hospital Course:


Generally healthy 49yo M admitted 30 hours after onset of right hand clumsiness 

and vertigo. He had played hockey on 9/7/18 where he sustained a headstrike and 

subsequently developed symptoms. Head CT showed subacute R cerebellar infarct 

in PICA territory. CTA head/neck showed occluded R vertebral artery most 

consistent with dissected artery. MRI confirmed the PICA territory infarct. He 

did not receive tPA. Given his lack of atherosclerotic risk factors, it was 

felt that trauma leading to arterial dissection was the most likely cause of 

his CVA. He was started on baby aspirin but declined statin therapy (LDL 94). 

His symptoms of right sided ataxia, gait instability, and vertigo have pretty 

much resolved at time of discharge. PT/OT recommended outpatient therapy. He 

will follow up with neurology.





Of note, his TTE showed a PFO however this was thought to be an incidental 

finding as we do not think his stroke was embolic in origin. The TTE 

additionally showed a dilated aortic sinus of Valsalva measuring 5.2cm. Given 

the dissected artery and dilated aorta, the possibility of a connective tissue 

disease was discussed. Cardiology (Dr Oswaldo Darnell) would like to follow up with 

the patient in the near future with aorta and renal artery imaging. 





Medications: Please refer to EMR for complete list. Additions this 

hospitalization include aspirin 81mg daily.





Follow Up Plan:


1. Neurology clinic visit in 1-2 weeks


2. Cardiology clinic visit with Dr Oswaldo Darnell. Plan to obtain CTA chest/abdomen 

to evaluate aorta and renal arteries


3. Consider starting statin therapy. Monitor BP





Physical Exam: Vitals reviewed, normotensive. Alert and oriented. Heart regular 

rate and rhythm without murmur or fixed split S2. Lungs clear. Abdomen soft, 

nontender. No extremity edema. Full strength and sensation intact in all 

extremities. Narrow based gait. No ataxia in upper or lower extremities.

## 2018-09-11 NOTE — ASMTDCNOTE
Case Management Discharge

 

Discharge Order Complete?     Answers:  Yes                                   

Patient to Obtain             Answers:  via Family                            

Medications                                                                   

Transportation Arranged       Answers:  Family/Friends                        

Transport will Pick (Date     09/11/2018 04:00 PM

& Time)                       

Family Notified               Answers:  Yes                           Notes:  Family to transport

Discharge Comments            

Notes:

Patient has been discharged home with wife.

 

Date Signed:  09/11/2018 02:49 PM

Electronically Signed By:Kika Izquierdo LCSW

## 2018-09-11 NOTE — ASMTLACE
LACE

 

Length of stay for            Answers:  2 days                                

current admission                                                             

Acuity / Level of             Answers:  Yes                                   

Care: Did the patient                                                         

have an inpatient                                                             

admission?                                                                    

Comorbidities - select        Answers:  Other                         Notes:  HTN

all that apply                                                                

# of Emergency department     Answers:  1-2                                   

visits in the last 6                                                          

months                                                                        

Score: 7

 

Date Signed:  09/11/2018 02:49 PM

Electronically Signed By:Kika Izquierdo LCSW